# Patient Record
(demographics unavailable — no encounter records)

---

## 2025-01-07 NOTE — HISTORY OF PRESENT ILLNESS
[FreeTextEntry1] : NO COVID.   COVID VACCINE FULL.  HPI: 96-year-old male presents today with his daughter and wife. He lives with his daughter, granddaughter and wife. He just moved back from [place]. His memory has worsened x2 years. He has days and nights mixed up. He sleeps a lot during day. He wears bilateral hearing aids. He has no hallucinations or delusions. No REM sleep disorder. No known family history of Dementia. No changes in behavior. In  he had a craniotomy due to a fall. No recent hospitalizations or falls. PT 3x weekly (60 min each session)    PMH: GERD, HLD  -Memory: STM loss   -Speech: ok  -Orientation: ok -Praxis: ok  -Decision making/Executive fx/Multitasking: ok   -Sleep: poor. days and nights mixed up  -Appetite: good  -Motor symptoms: Uses walker for left drop foot     -B/B: sometimes  -Psychiatric symptoms: Anxiety    -Functional status:   Greer Index of Dawes in Activities of Daily Livin. Bathing/Showerin  2. Dressin  3. Toiletin   4. Transferrin 5. Continence:  1 6: Feedin    TOTAL: 4      Sinan-Reji Instrumental Activities of Daily Living:  A. Ability to Use Telephone: 0   B. Shoppin C. Food Preparation: 0    D. Housekeepin   E. Laundry:  0 F. Transportation: 0    G. Responsibility for Own Medications: 0  H: Ability to Handle Finances:  0 TOTAL: 0    CDR: 2    -Professional status: Business owner of restaurant   PCP and other physicians:  -PCP: Dr. Nissenbaum  Workup done: None

## 2025-01-07 NOTE — ASSESSMENT
[FreeTextEntry1] : Assessment: 96-year-old male with pmh anxiety, hld and GERD. He has left foot drop and uses walker due to unsteady gait. Needs assistance with getting into shower, needs assistance with iadls. MMSE 16/30. Poor visual spatial skills. Poor recall of words. He is unable to draw a clock   Diagnostic Impression:     -Insomnia -Anxiety -memory loss   Plan: -Labs -Educated on importance of lifestyle modifications such as daily exercise, healthy balanced diet and good sleep habits -Educated on importance of sleeping at night and not during the day

## 2025-01-07 NOTE — PHYSICAL EXAM
[General Appearance - Alert] : alert [Affect] : the affect was normal [Person] : oriented to person [Place] : oriented to place [Time] : disoriented to time [Short Term Intact] : short term memory impaired [Remote Intact] : remote memory intact [Registration Intact] : recent registration memory intact [Span Intact] : the attention span was normal [Concentration Intact] : normal concentrating ability [Visual Intact] : visual attention was ~T ~L decreased [Naming Objects] : difficulty naming common objects [Repeating Phrases] : no difficulty repeating a phrase [Writing A Sentence] : difficulty writing a sentence [Fluency] : fluency intact [Comprehension] : comprehension intact [Reading] : reading intact [Current Events] : inadequate knowledge of current events [Past History] : adequate knowledge of personal past history [Vocabulary] : adequate range of vocabulary [Total Score ___ / 30] : the patient achieved a score of [unfilled] /30 [Date / Time ___ / 5] : date / time [unfilled] / 5 [Place ___ / 5] : place [unfilled] / 5 [Registration ___ / 3] : registration [unfilled] / 3 [Serial Sevens ___/5] : serial sevens [unfilled] / 5 [Naming 2 Objects ___ / 2] : naming two objects [unfilled] / 2 [Repeating a Sentence ___ / 1] : repeating a sentence [unfilled] / 1 [Writing a Sentence ___ / 1] : write sentence [unfilled] / 1 [3-stage Verbal Command ___ / 3] : three-stage verbal command [unfilled] / 3 [Written Command ___ / 1] : written command [unfilled] / 1 [Copy a Design ___ / 1] : copy a design [unfilled] / 1 [Recall ___ / 3] : recall [unfilled] / 3 [Cranial Nerves Optic (II)] : visual acuity intact bilaterally,  visual fields full to confrontation, pupils equal round and reactive to light [Cranial Nerves Oculomotor (III)] : extraocular motion intact [Cranial Nerves Trigeminal (V)] : facial sensation intact symmetrically [Cranial Nerves Facial (VII)] : face symmetrical [Cranial Nerves Glossopharyngeal (IX)] : tongue and palate midline [Cranial Nerves Accessory (XI - Cranial And Spinal)] : head turning and shoulder shrug symmetric [Cranial Nerves Hypoglossal (XII)] : there was no tongue deviation with protrusion [Motor Tone] : muscle tone was normal in all four extremities [Motor Strength] : muscle strength was normal in all four extremities [Motor Strength Upper Extremities Bilaterally] : strength was normal in both upper extremities [Motor Strength Lower Extremities Bilaterally] : strength was normal in both lower extremities [Romberg's Sign] : a positive Romberg's sign was present [Limited Balance] : the patient's balance was impaired [Tremor] : no tremor present [2+] : Brachioradialis left 2+ [1+] : Ankle jerk left 1+ [FreeTextEntry4] : Mental Status Exam   Presidents: 0/5 Alternating Pattern:  Spiral: ok Clock: 1/3 Repetition: ok Trail A: B:  Fluency: A: Animals:   Go-No-Go:    R/L discrimination on self and examiner:  Cross-line commands:  Praxis: - Motor: ok -Dynamic/Luria:  -Ideomotor/Imitation:   -Ideational/writing/closing-in:  -Dressing: [FreeTextEntry6] : left foot drop  [FreeTextEntry8] : cautioned gait, reduced arm swing. slow steppage. uses walker  [Sclera] : the sclera and conjunctiva were normal [PERRL With Normal Accommodation] : pupils were equal in size, round, reactive to light, with normal accommodation [Extraocular Movements] : extraocular movements were intact [Full Visual Field] : full visual field

## 2025-01-07 NOTE — REVIEW OF SYSTEMS
[Anxiety] : anxiety [Confused or Disoriented] : confusion [Memory Lapses or Loss] : memory loss [Decr. Concentrating Ability] : no decrease in concentrating ability [Difficulty with Language] : no ~M difficulty with language [Changed Thought Patterns] : no change in thought patterns [Repeating Questions] : repeated questioning about recent events [Facial Weakness] : no facial weakness [Arm Weakness] : no arm weakness [Hand Weakness] : no hand weakness [Leg Weakness] : leg weakness [Poor Coordination] : poor coordination [Difficulty Writing] : difficulty writing [Difficulties in Speech] : no speech difficulties [Numbness] : no numbness [Tingling] : no tingling [Abnormal Sensation] : no abnormal sensation [Hypersensitivity] : no hypersensitivity [Seizures] : no convulsions [Dizziness] : no dizziness [Fainting] : no fainting [Lightheadedness] : no lightheadedness [Vertigo] : no vertigo [Cluster Headache] : no cluster headache [Migraine Headache] : no migraine headache [Tension Headache] : no tension-type headache [Difficulty Walking] : difficulty walking [Inability to Walk] : able to walk [Ataxia] : no ataxia [Frequent Falls] : not falling [Limping] : not limping [Eye Pain] : eye pain [Loss Of Hearing] : hearing loss [Negative] : Heme/Lymph